# Patient Record
Sex: MALE | Race: WHITE | NOT HISPANIC OR LATINO | Employment: OTHER | URBAN - METROPOLITAN AREA
[De-identification: names, ages, dates, MRNs, and addresses within clinical notes are randomized per-mention and may not be internally consistent; named-entity substitution may affect disease eponyms.]

---

## 2022-09-16 ENCOUNTER — APPOINTMENT (OUTPATIENT)
Dept: CARDIOLOGY | Facility: HOSPITAL | Age: 72
End: 2022-09-16

## 2022-09-16 ENCOUNTER — APPOINTMENT (OUTPATIENT)
Dept: GENERAL RADIOLOGY | Facility: HOSPITAL | Age: 72
End: 2022-09-16

## 2022-09-16 ENCOUNTER — HOSPITAL ENCOUNTER (OUTPATIENT)
Facility: HOSPITAL | Age: 72
Setting detail: OBSERVATION
LOS: 1 days | Discharge: HOME | End: 2022-09-17
Attending: EMERGENCY MEDICINE | Admitting: INTERNAL MEDICINE

## 2022-09-16 DIAGNOSIS — I48.91 ATRIAL FIBRILLATION, UNSPECIFIED TYPE: Primary | ICD-10-CM

## 2022-09-16 DIAGNOSIS — R06.02 SHORTNESS OF BREATH: ICD-10-CM

## 2022-09-16 PROBLEM — I47.1 SVT (SUPRAVENTRICULAR TACHYCARDIA): Status: ACTIVE | Noted: 2022-09-16

## 2022-09-16 LAB
ALBUMIN SERPL-MCNC: 4 G/DL (ref 3.5–5.2)
ALBUMIN/GLOB SERPL: 1.6 G/DL
ALP SERPL-CCNC: 26 U/L (ref 39–117)
ALT SERPL W P-5'-P-CCNC: 14 U/L (ref 1–41)
ANION GAP SERPL CALCULATED.3IONS-SCNC: 14 MMOL/L (ref 5–15)
AST SERPL-CCNC: 20 U/L (ref 1–40)
BASOPHILS # BLD AUTO: 0 10*3/MM3 (ref 0–0.2)
BASOPHILS NFR BLD AUTO: 0.4 % (ref 0–1.5)
BH CV ECHO MEAS - AO MAX PG: 38.5 MMHG
BH CV ECHO MEAS - AO MEAN PG: 23 MMHG
BH CV ECHO MEAS - AO ROOT DIAM: 4 CM
BH CV ECHO MEAS - AO V2 MAX: 310.4 CM/SEC
BH CV ECHO MEAS - AO V2 VTI: 55.5 CM
BH CV ECHO MEAS - AVA(I,D): 0.7 CM2
BH CV ECHO MEAS - EDV(CUBED): 153.4 ML
BH CV ECHO MEAS - EDV(MOD-SP4): 83.5 ML
BH CV ECHO MEAS - EF(MOD-BP): 58 %
BH CV ECHO MEAS - EF(MOD-SP4): 58.1 %
BH CV ECHO MEAS - ESV(CUBED): 78.2 ML
BH CV ECHO MEAS - ESV(MOD-SP4): 35 ML
BH CV ECHO MEAS - FS: 20.1 %
BH CV ECHO MEAS - IVS/LVPW: 1.01 CM
BH CV ECHO MEAS - IVSD: 1.03 CM
BH CV ECHO MEAS - LA DIMENSION: 4.7 CM
BH CV ECHO MEAS - LV MASS(C)D: 210.8 GRAMS
BH CV ECHO MEAS - LV MAX PG: 2.27 MMHG
BH CV ECHO MEAS - LV MEAN PG: 1.01 MMHG
BH CV ECHO MEAS - LV V1 MAX: 75.3 CM/SEC
BH CV ECHO MEAS - LV V1 VTI: 13.9 CM
BH CV ECHO MEAS - LVIDD: 5.4 CM
BH CV ECHO MEAS - LVIDS: 4.3 CM
BH CV ECHO MEAS - LVOT AREA: 2.8 CM2
BH CV ECHO MEAS - LVOT DIAM: 1.89 CM
BH CV ECHO MEAS - LVPWD: 1.02 CM
BH CV ECHO MEAS - MR MAX PG: 138 MMHG
BH CV ECHO MEAS - MR MAX VEL: 586.9 CM/SEC
BH CV ECHO MEAS - MR MEAN PG: 74.9 MMHG
BH CV ECHO MEAS - MR MEAN VEL: 415 CM/SEC
BH CV ECHO MEAS - MR VTI: 113.7 CM
BH CV ECHO MEAS - MV MAX PG: 25.2 MMHG
BH CV ECHO MEAS - MV MEAN PG: 15 MMHG
BH CV ECHO MEAS - MV V2 VTI: 46.5 CM
BH CV ECHO MEAS - MVA(VTI): 0.84 CM2
BH CV ECHO MEAS - PA V2 MAX: 91.1 CM/SEC
BH CV ECHO MEAS - PI END-D VEL: 141.7 CM/SEC
BH CV ECHO MEAS - RAP SYSTOLE: 8 MMHG
BH CV ECHO MEAS - RV MAX PG: 0.96 MMHG
BH CV ECHO MEAS - RV V1 MAX: 48.9 CM/SEC
BH CV ECHO MEAS - RV V1 VTI: 7.5 CM
BH CV ECHO MEAS - RVDD: 2.6 CM
BH CV ECHO MEAS - RVSP: 43.6 MMHG
BH CV ECHO MEAS - SV(LVOT): 39.1 ML
BH CV ECHO MEAS - SV(MOD-SP4): 48.5 ML
BH CV ECHO MEAS - TR MAX PG: 35.6 MMHG
BH CV ECHO MEAS - TR MAX VEL: 298.2 CM/SEC
BILIRUB SERPL-MCNC: 1.1 MG/DL (ref 0–1.2)
BUN SERPL-MCNC: 18 MG/DL (ref 8–23)
BUN/CREAT SERPL: 15.4 (ref 7–25)
CALCIUM SPEC-SCNC: 8.8 MG/DL (ref 8.6–10.5)
CHLORIDE SERPL-SCNC: 99 MMOL/L (ref 98–107)
CO2 SERPL-SCNC: 23 MMOL/L (ref 22–29)
CREAT SERPL-MCNC: 1.17 MG/DL (ref 0.76–1.27)
DEPRECATED RDW RBC AUTO: 42.4 FL (ref 37–54)
EGFRCR SERPLBLD CKD-EPI 2021: 66.2 ML/MIN/1.73
EOSINOPHIL # BLD AUTO: 0 10*3/MM3 (ref 0–0.4)
EOSINOPHIL NFR BLD AUTO: 0.3 % (ref 0.3–6.2)
ERYTHROCYTE [DISTWIDTH] IN BLOOD BY AUTOMATED COUNT: 13.4 % (ref 12.3–15.4)
GLOBULIN UR ELPH-MCNC: 2.5 GM/DL
GLUCOSE SERPL-MCNC: 124 MG/DL (ref 65–99)
HCT VFR BLD AUTO: 42.2 % (ref 37.5–51)
HGB BLD-MCNC: 14.3 G/DL (ref 13–17.7)
LYMPHOCYTES # BLD AUTO: 1 10*3/MM3 (ref 0.7–3.1)
LYMPHOCYTES NFR BLD AUTO: 10 % (ref 19.6–45.3)
MAXIMAL PREDICTED HEART RATE: 148 BPM
MCH RBC QN AUTO: 30.6 PG (ref 26.6–33)
MCHC RBC AUTO-ENTMCNC: 33.7 G/DL (ref 31.5–35.7)
MCV RBC AUTO: 90.6 FL (ref 79–97)
MONOCYTES # BLD AUTO: 1.1 10*3/MM3 (ref 0.1–0.9)
MONOCYTES NFR BLD AUTO: 11.1 % (ref 5–12)
NEUTROPHILS NFR BLD AUTO: 78.2 % (ref 42.7–76)
NEUTROPHILS NFR BLD AUTO: 8 10*3/MM3 (ref 1.7–7)
NRBC BLD AUTO-RTO: 0.1 /100 WBC (ref 0–0.2)
NT-PROBNP SERPL-MCNC: 2234 PG/ML (ref 0–900)
PLATELET # BLD AUTO: 257 10*3/MM3 (ref 140–450)
PMV BLD AUTO: 8.4 FL (ref 6–12)
POTASSIUM SERPL-SCNC: 4.8 MMOL/L (ref 3.5–5.2)
PROT SERPL-MCNC: 6.5 G/DL (ref 6–8.5)
RBC # BLD AUTO: 4.66 10*6/MM3 (ref 4.14–5.8)
SODIUM SERPL-SCNC: 136 MMOL/L (ref 136–145)
STRESS TARGET HR: 126 BPM
TROPONIN T SERPL-MCNC: <0.01 NG/ML (ref 0–0.03)
WBC NRBC COR # BLD: 10.2 10*3/MM3 (ref 3.4–10.8)

## 2022-09-16 PROCEDURE — 96372 THER/PROPH/DIAG INJ SC/IM: CPT

## 2022-09-16 PROCEDURE — 93306 TTE W/DOPPLER COMPLETE: CPT | Performed by: INTERNAL MEDICINE

## 2022-09-16 PROCEDURE — 83880 ASSAY OF NATRIURETIC PEPTIDE: CPT | Performed by: NURSE PRACTITIONER

## 2022-09-16 PROCEDURE — 36415 COLL VENOUS BLD VENIPUNCTURE: CPT

## 2022-09-16 PROCEDURE — 84484 ASSAY OF TROPONIN QUANT: CPT | Performed by: NURSE PRACTITIONER

## 2022-09-16 PROCEDURE — 80053 COMPREHEN METABOLIC PANEL: CPT | Performed by: NURSE PRACTITIONER

## 2022-09-16 PROCEDURE — 85025 COMPLETE CBC W/AUTO DIFF WBC: CPT | Performed by: NURSE PRACTITIONER

## 2022-09-16 PROCEDURE — 96375 TX/PRO/DX INJ NEW DRUG ADDON: CPT

## 2022-09-16 PROCEDURE — 96376 TX/PRO/DX INJ SAME DRUG ADON: CPT

## 2022-09-16 PROCEDURE — 25010000002 ENOXAPARIN PER 10 MG: Performed by: INTERNAL MEDICINE

## 2022-09-16 PROCEDURE — 93306 TTE W/DOPPLER COMPLETE: CPT

## 2022-09-16 PROCEDURE — 96366 THER/PROPH/DIAG IV INF ADDON: CPT

## 2022-09-16 PROCEDURE — 96365 THER/PROPH/DIAG IV INF INIT: CPT

## 2022-09-16 PROCEDURE — 93010 ELECTROCARDIOGRAM REPORT: CPT | Performed by: INTERNAL MEDICINE

## 2022-09-16 PROCEDURE — 99284 EMERGENCY DEPT VISIT MOD MDM: CPT

## 2022-09-16 PROCEDURE — 25010000002 FUROSEMIDE PER 20 MG: Performed by: INTERNAL MEDICINE

## 2022-09-16 PROCEDURE — G0378 HOSPITAL OBSERVATION PER HR: HCPCS

## 2022-09-16 PROCEDURE — 71045 X-RAY EXAM CHEST 1 VIEW: CPT

## 2022-09-16 PROCEDURE — 93005 ELECTROCARDIOGRAM TRACING: CPT | Performed by: INTERNAL MEDICINE

## 2022-09-16 PROCEDURE — 99205 OFFICE O/P NEW HI 60 MIN: CPT | Performed by: INTERNAL MEDICINE

## 2022-09-16 PROCEDURE — 93005 ELECTROCARDIOGRAM TRACING: CPT

## 2022-09-16 RX ORDER — TRAMADOL HYDROCHLORIDE 50 MG/1
50 TABLET ORAL EVERY 6 HOURS PRN
Status: DISCONTINUED | OUTPATIENT
Start: 2022-09-16 | End: 2022-09-17 | Stop reason: HOSPADM

## 2022-09-16 RX ORDER — DILTIAZEM HYDROCHLORIDE 5 MG/ML
5 INJECTION INTRAVENOUS ONCE
Status: COMPLETED | OUTPATIENT
Start: 2022-09-16 | End: 2022-09-16

## 2022-09-16 RX ORDER — ACETAMINOPHEN 650 MG/1
650 SUPPOSITORY RECTAL EVERY 4 HOURS PRN
Status: DISCONTINUED | OUTPATIENT
Start: 2022-09-16 | End: 2022-09-17 | Stop reason: HOSPADM

## 2022-09-16 RX ORDER — METOPROLOL SUCCINATE 50 MG/1
50 TABLET, EXTENDED RELEASE ORAL
Status: DISCONTINUED | OUTPATIENT
Start: 2022-09-16 | End: 2022-09-17

## 2022-09-16 RX ORDER — ONDANSETRON 4 MG/1
4 TABLET, FILM COATED ORAL EVERY 6 HOURS PRN
Status: DISCONTINUED | OUTPATIENT
Start: 2022-09-16 | End: 2022-09-17 | Stop reason: HOSPADM

## 2022-09-16 RX ORDER — NITROGLYCERIN 0.4 MG/1
0.4 TABLET SUBLINGUAL
Status: DISCONTINUED | OUTPATIENT
Start: 2022-09-16 | End: 2022-09-17 | Stop reason: HOSPADM

## 2022-09-16 RX ORDER — ACETAMINOPHEN 160 MG/5ML
650 SOLUTION ORAL EVERY 4 HOURS PRN
Status: DISCONTINUED | OUTPATIENT
Start: 2022-09-16 | End: 2022-09-17 | Stop reason: HOSPADM

## 2022-09-16 RX ORDER — SODIUM CHLORIDE 0.9 % (FLUSH) 0.9 %
10 SYRINGE (ML) INJECTION AS NEEDED
Status: DISCONTINUED | OUTPATIENT
Start: 2022-09-16 | End: 2022-09-17 | Stop reason: HOSPADM

## 2022-09-16 RX ORDER — ACETAMINOPHEN 325 MG/1
650 TABLET ORAL EVERY 4 HOURS PRN
Status: DISCONTINUED | OUTPATIENT
Start: 2022-09-16 | End: 2022-09-17 | Stop reason: HOSPADM

## 2022-09-16 RX ORDER — AMIODARONE HYDROCHLORIDE 200 MG/1
400 TABLET ORAL EVERY 12 HOURS SCHEDULED
Status: DISCONTINUED | OUTPATIENT
Start: 2022-09-18 | End: 2022-09-17

## 2022-09-16 RX ORDER — AMIODARONE HYDROCHLORIDE 200 MG/1
200 TABLET ORAL DAILY
Status: DISCONTINUED | OUTPATIENT
Start: 2022-10-12 | End: 2022-09-17

## 2022-09-16 RX ORDER — FUROSEMIDE 10 MG/ML
40 INJECTION INTRAMUSCULAR; INTRAVENOUS ONCE
Status: COMPLETED | OUTPATIENT
Start: 2022-09-16 | End: 2022-09-16

## 2022-09-16 RX ORDER — DILTIAZEM HCL/D5W 125 MG/125
5-15 PLASTIC BAG, INJECTION (ML) INTRAVENOUS
Status: DISCONTINUED | OUTPATIENT
Start: 2022-09-16 | End: 2022-09-17

## 2022-09-16 RX ORDER — ONDANSETRON 2 MG/ML
4 INJECTION INTRAMUSCULAR; INTRAVENOUS EVERY 6 HOURS PRN
Status: DISCONTINUED | OUTPATIENT
Start: 2022-09-16 | End: 2022-09-17 | Stop reason: HOSPADM

## 2022-09-16 RX ORDER — SODIUM CHLORIDE 0.9 % (FLUSH) 0.9 %
10 SYRINGE (ML) INJECTION EVERY 12 HOURS SCHEDULED
Status: DISCONTINUED | OUTPATIENT
Start: 2022-09-16 | End: 2022-09-17 | Stop reason: HOSPADM

## 2022-09-16 RX ORDER — AMIODARONE HYDROCHLORIDE 200 MG/1
200 TABLET ORAL EVERY 12 HOURS
Status: DISCONTINUED | OUTPATIENT
Start: 2022-09-28 | End: 2022-09-17

## 2022-09-16 RX ORDER — LISINOPRIL 5 MG/1
10 TABLET ORAL
Status: DISCONTINUED | OUTPATIENT
Start: 2022-09-17 | End: 2022-09-17 | Stop reason: HOSPADM

## 2022-09-16 RX ORDER — ENOXAPARIN SODIUM 100 MG/ML
1 INJECTION SUBCUTANEOUS EVERY 12 HOURS
Status: DISCONTINUED | OUTPATIENT
Start: 2022-09-16 | End: 2022-09-17

## 2022-09-16 RX ORDER — AMIODARONE HYDROCHLORIDE 200 MG/1
200 TABLET ORAL ONCE
Status: DISCONTINUED | OUTPATIENT
Start: 2022-09-18 | End: 2022-09-17

## 2022-09-16 RX ADMIN — Medication 5 MG/HR: at 16:43

## 2022-09-16 RX ADMIN — METOPROLOL SUCCINATE 50 MG: 50 TABLET, EXTENDED RELEASE ORAL at 21:13

## 2022-09-16 RX ADMIN — FUROSEMIDE 40 MG: 10 INJECTION, SOLUTION INTRAMUSCULAR; INTRAVENOUS at 21:14

## 2022-09-16 RX ADMIN — ACETAMINOPHEN 650 MG: 325 TABLET, FILM COATED ORAL at 21:30

## 2022-09-16 RX ADMIN — DILTIAZEM HYDROCHLORIDE 5 MG: 5 INJECTION, SOLUTION INTRAVENOUS at 16:42

## 2022-09-16 RX ADMIN — ENOXAPARIN SODIUM 90 MG: 100 INJECTION SUBCUTANEOUS at 18:55

## 2022-09-16 RX ADMIN — Medication 10 ML: at 21:00

## 2022-09-16 NOTE — ED PROVIDER NOTES
Subjective   History of Present Illness  Patient presents with:  Shortness of Breath    No primary care provider on file.   No LMP for male patient.    Patient is a 72-year-old male presents emergency department with complaint of dyspnea, fatigue over the past week, patient does report he is from out of town, he states he is visiting his daughter, and reports that he has 2 valve replacements, he was told by his cardiologist that they may not be working properly, if they have fatigue or dyspnea to report to the emergency department.  He denies any dizziness, lightheadedness, diaphoresis or syncope.  No abnormal leg pain or swelling.  No chest pain.  Patient is not currently anticoagulated.  He denies any history of atrial fibrillation that he is aware of.        Review of Systems   Constitutional: Negative for chills and fever.   Respiratory: Positive for shortness of breath. Negative for chest tightness.    Cardiovascular: Negative for chest pain, palpitations and leg swelling.   Gastrointestinal: Negative for abdominal pain, diarrhea, nausea and vomiting.   Genitourinary: Negative for dysuria.   Musculoskeletal: Negative for back pain and neck pain.   Skin: Negative for color change and rash.   Neurological: Negative for dizziness, syncope, weakness and light-headedness.       No past medical history on file.    Allergies   Allergen Reactions   • Augmentin [Amoxicillin-Pot Clavulanate] Other (See Comments)     Abnormal liver function labs   • Clindamycin/Lincomycin Other (See Comments)     Pt reports clinda mycin causes abnormal liver function        No past surgical history on file.    No family history on file.    Social History     Socioeconomic History   • Marital status: Legally            Objective   Physical Exam  Vitals and nursing note reviewed.   Constitutional:       General: He is not in acute distress.     Appearance: He is well-developed. He is not toxic-appearing.   HENT:      Head:  "Normocephalic and atraumatic.      Mouth/Throat:      Mouth: Mucous membranes are moist.      Pharynx: Oropharynx is clear.   Eyes:      Extraocular Movements: Extraocular movements intact.      Pupils: Pupils are equal, round, and reactive to light.   Cardiovascular:      Rate and Rhythm: Tachycardia present. Rhythm irregular.      Heart sounds: No murmur heard.    No friction rub. No gallop.   Pulmonary:      Effort: Pulmonary effort is normal.      Breath sounds: Normal breath sounds.   Musculoskeletal:         General: Normal range of motion.      Cervical back: Normal range of motion and neck supple.      Right lower leg: No tenderness. No edema.      Left lower leg: No tenderness. No edema.   Skin:     General: Skin is warm and dry.      Capillary Refill: Capillary refill takes less than 2 seconds.   Neurological:      General: No focal deficit present.      Mental Status: He is alert and oriented to person, place, and time.   Psychiatric:         Mood and Affect: Mood normal.         Behavior: Behavior normal.         Procedures           ED Course  ED Course as of 09/16/22 2014   Fri Sep 16, 2022   1703 Nursing staff to remove O2 saturations were 83, 85 and 0%.  She reported these were not accurate. [LB]   1716 Patient resting.  No acute distress. [LB]   1723 Spoke with Dr. Mcmullen [LB]   1724 Unit secondary to due to time records, however they were closed for the day. [LB]      ED Course User Index  [LB] Rhoda Pantoja, APRN                /71   Pulse (!) 144   Temp 98.4 °F (36.9 °C) (Oral)   Resp 18   Ht 172.7 cm (68\")   Wt 88.6 kg (195 lb 5.2 oz)   SpO2 92%   BMI 29.70 kg/m²   Labs Reviewed   COMPREHENSIVE METABOLIC PANEL - Abnormal; Notable for the following components:       Result Value    Glucose 124 (*)     Alkaline Phosphatase 26 (*)     All other components within normal limits    Narrative:     GFR Normal >60  Chronic Kidney Disease <60  Kidney Failure <15     BNP (IN-HOUSE) - " Abnormal; Notable for the following components:    proBNP 2,234.0 (*)     All other components within normal limits    Narrative:     Among patients with dyspnea, NT-proBNP is highly sensitive for the detection of acute congestive heart failure. In addition NT-proBNP of <300 pg/ml effectively rules out acute congestive heart failure with 99% negative predictive value.    Results may be falsely decreased if patient taking Biotin.     CBC WITH AUTO DIFFERENTIAL - Abnormal; Notable for the following components:    Neutrophil % 78.2 (*)     Lymphocyte % 10.0 (*)     Neutrophils, Absolute 8.00 (*)     Monocytes, Absolute 1.10 (*)     All other components within normal limits   TROPONIN (IN-HOUSE) - Normal    Narrative:     Troponin T Reference Range:  <= 0.03 ng/mL-   Negative for AMI  >0.03 ng/mL-     Abnormal for myocardial necrosis.  Clinicians would have to utilize clinical acumen, EKG, Troponin and serial changes to determine if it is an Acute Myocardial Infarction or myocardial injury due to an underlying chronic condition.       Results may be falsely decreased if patient taking Biotin.     CBC AND DIFFERENTIAL    Narrative:     The following orders were created for panel order CBC & Differential.  Procedure                               Abnormality         Status                     ---------                               -----------         ------                     CBC Auto Differential[[id number]]        Abnormal            Final result                 Please view results for these tests on the individual orders.   GOLD TOP - SST   EXTRA TUBES    Narrative:     The following orders were created for panel order Extra Tubes.  Procedure                               Abnormality         Status                     ---------                               -----------         ------                     Gold Top - SST[[id number]]                                   Final result               Light Blue Top[[id number]]                                                                 Please view results for these tests on the individual orders.   LIGHT BLUE TOP     Medications   sodium chloride 0.9 % flush 10 mL (has no administration in time range)   dilTIAZem (CARDIZEM) 125 mg in 125 mL D5W infusion (15 mg/hr Intravenous Rate/Dose Change 9/16/22 1857)   sodium chloride 0.9 % flush 10 mL (has no administration in time range)   sodium chloride 0.9 % flush 10 mL (has no administration in time range)   ondansetron (ZOFRAN) tablet 4 mg (has no administration in time range)     Or   ondansetron (ZOFRAN) injection 4 mg (has no administration in time range)   Enoxaparin Sodium (LOVENOX) syringe 90 mg (90 mg Subcutaneous Given 9/16/22 1855)   nitroglycerin (NITROSTAT) SL tablet 0.4 mg (has no administration in time range)   acetaminophen (TYLENOL) tablet 650 mg (has no administration in time range)     Or   acetaminophen (TYLENOL) 160 MG/5ML solution 650 mg (has no administration in time range)     Or   acetaminophen (TYLENOL) suppository 650 mg (has no administration in time range)   lisinopril (PRINIVIL,ZESTRIL) tablet 10 mg (has no administration in time range)   traMADol (ULTRAM) tablet 50 mg (has no administration in time range)   amiodarone in dextrose 5% (NEXTERONE) loading dose 150mg/100mL (has no administration in time range)     Followed by   amiodarone 360 mg in 200 mL D5W infusion (has no administration in time range)     Followed by   amiodarone 360 mg in 200 mL D5W infusion (has no administration in time range)     Followed by   amiodarone (PACERONE) tablet 200 mg (has no administration in time range)     Followed by   amiodarone (PACERONE) tablet 400 mg (has no administration in time range)     Followed by   amiodarone (PACERONE) tablet 200 mg (has no administration in time range)     Followed by   amiodarone (PACERONE) tablet 200 mg (has no administration in time range)   metoprolol succinate XL (TOPROL-XL) 24 hr tablet 50 mg  (has no administration in time range)   furosemide (LASIX) injection 40 mg (has no administration in time range)   dilTIAZem (CARDIZEM) injection 5 mg (5 mg Intravenous Given 9/16/22 1642)     XR Chest 1 View    Result Date: 9/16/2022  No acute chest finding. Mild cardiomegaly with valve replacement.  Electronically Signed By-Nanda Aparicio MD On:9/16/2022 4:13 PM This report was finalized on [id number] by  Nanda Aparicio MD.                               MDM  Record/Chart Review: No records previously available.  EKG reviewed by MD and I, our findings are: A. fib rate of 150  No previous available.  Differentials: [place] imbalance, arrhythmia, CHF, STEMI, NSTEMI  Patient was brought back to the emergency department room for evaluation and placed on appropriate monitoring.  Patient had IV established and blood work obtained.  Patient noted to be in A. fib with RVR.  He was started on a Cardizem drip.  CBC, CMP, troponin, BNP reviewed.  Chest x-ray reviewed.  Patient will be admitted to hospitalist service for further evaluation.    Managment of patient discussed with: Hospitalist.  Call was placed to cardiology, however no callback at this time.  Patient family requested Dr. Guzman.  A note was initiated by him.    Note Disclaimer: At [facility], we believe that sharing information builds trust and better relationships. You are receiving this note because you recently visited [facility]. It is possible you will see health information before a provider has talked with you about it. This kind of information can be easy to misunderstand. To help you fully understand what it means for your health, we urge you to discuss this note with your provider.  Note dicatated utilizing Dragon Dictation.     Final diagnoses:   Atrial fibrillation, unspecified type (HCC)   Shortness of breath       ED Disposition  ED Disposition     ED Disposition   Decision to Admit    Condition   --    Comment   Level of Care:  Telemetry [5]   Diagnosis: Atrial fibrillation, unspecified type (HCC) [[id number]]               No follow-up provider specified.       Medication List      No changes were made to your prescriptions during this visit.          Rhoda Pantoja, APRN  09/16/22 2014

## 2022-09-16 NOTE — CONSULTS
Referring Provider: Edith Mcmullen DO  Reason for Consultation: Atrial fibrillation with rapid ventricular response    Patient Care Team:  Jono Guzman MD as PCP - General (Cardiology)    Chief complaint : Dyspnea on exertion, shortness of breath      History of present illness:  Rodrigo Miguel is a 72 y.o. male  with extensive cardiovascular history including aortic, tricuspid and ?  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  He is originally from [place] and visiting [place] for his high school reunion.  He has noted that over the last 2 weeks his shortness of breath has gotten worse to the point that now he gets out of breath with minimal exertion, walking a few feet causes him to have significant shortness of breath.  His daughter Madie Forrest contacted me about his symptoms and was worried about his valvular heart disease as he was recently told that his aortic prosthesis was not working well.  He denies any chest pain.  He does have significant back pain and lower extremity pain which she feels is at his baseline however the pain has worsened since his arrival to the emergency room.  While in the emergency room an ECG showed atrial fibrillation with rapid ventricular response with heart rates in the 150s.  He has been started on a Cardizem drip.  His home medications include lisinopril, carvedilol, aspirin.    REVIEW OF SYSTEMS:    Constitutional: No weakness,fatigue, fever, rigors, chills   Eyes: No vision changes, eye pain   ENT/oropharynx: No difficulty swallowing, sore throat, epistaxis, changes in hearing   Cardiovascular: No chest pain, chest tightness, palpitations, paroxysmal nocturnal dyspnea, orthopnea, diaphoresis, dizziness / syncopal episode   Respiratory: + shortness of breath, dyspnea on exertion, cough, wheezing hemoptysis   Gastrointestinal: No abdominal pain, nausea, vomiting, diarrhea, bloody stools   Genitourinary: No hematuria, dysuria   Neurological: No headache,  "tremors, numbness,  one-sided weakness    Musculoskeletal: No cramps, myalgias,  joint pain, joint swelling   Integument: No rash, edema          History  Past medical history of endocarditis    Past surgical history including aortic,?  Mitral and tricuspid valve replacement    No significant family history of valvular heart disease, premature coronary disease, sudden cardiac death      Augmentin [amoxicillin-pot clavulanate] and Clindamycin/lincomycin    Scheduled Meds:enoxaparin, 1 mg/kg, Subcutaneous, Q12H  [START ON 9/17/2022] lisinopril, 10 mg, Oral, Q24H  sodium chloride, 10 mL, Intravenous, Q12H      Continuous Infusions:dilTIAZem, 5-15 mg/hr, Last Rate: 15 mg/hr (09/16/22 1857)      PRN Meds:.•  acetaminophen **OR** acetaminophen **OR** acetaminophen  •  nitroglycerin  •  ondansetron **OR** ondansetron  •  [COMPLETED] Insert peripheral IV **AND** sodium chloride  •  sodium chloride  •  traMADol    Objective     VITAL SIGNS  Vitals:    09/16/22 1902 09/16/22 1912 09/16/22 1922 09/16/22 1931   BP: 103/62 111/76 105/70 104/71   BP Location:       Patient Position:       Pulse: (!) 125 118 118 (!) 144   Resp:       Temp:       TempSrc:       SpO2: 92% 96% 96% 92%   Weight:       Height:           Flowsheet Rows    Flowsheet Row First Filed Value   Admission Height 172.7 cm (68\") Documented at 09/16/2022 1537   Admission Weight 88.6 kg (195 lb 5.2 oz) Documented at 09/16/2022 1537          No intake or output data in the 24 hours ending 09/16/22 1949     TELEMETRY: Atrial fibrillation with rapid ventricular response    Physical Exam:  The patient is alert, oriented and in no distress.  Vital signs as noted above.  Head and neck revealed no carotid bruits or jugular venous distention.  No thyromegaly or lymph adenopathy is present  Lungs clear.  No wheezing.  Breath sounds are normal bilaterally.  Irregularly irregular and tachycardic.  Difficult to appreciate murmur however there is a systolic ejection murmur at " the right upper sternal border.  There is also a holosystolic murmur at the apex. no precordial rub is present.  No gallop is present.  Abdomen soft and nontender.  No organomegaly is present.  Extremities with good peripheral pulses without any pedal edema.  Skin warm and dry.  Musculoskeletal system is grossly normal  CNS grossly normal      Results Review:   I reviewed the patient's new clinical results.  Lab Results (last 24 hours)     Procedure Component Value Units Date/Time    Extra Tubes [[id number]] Collected: 09/16/22 1656    Specimen: Blood, Venous Line Updated: 09/16/22 1715    Narrative:      The following orders were created for panel order Extra Tubes.  Procedure                               Abnormality         Status                     ---------                               -----------         ------                     Gold Top - SST[[id number]]                                   Final result               Light Blue Top[[id number]]                                                                Please view results for these tests on the individual orders.    Gold Top - SST [[id number]] Collected: 09/16/22 1656    Specimen: Blood Updated: 09/16/22 1715    Comprehensive Metabolic Panel [[id number]]  (Abnormal) Collected: 09/16/22 1656    Specimen: Blood Updated: 09/16/22 1712     Glucose 124 mg/dL      BUN 18 mg/dL      Creatinine 1.17 mg/dL      Sodium 136 mmol/L      Potassium 4.8 mmol/L      Comment: Slight hemolysis detected by analyzer. Results may be affected.        Chloride 99 mmol/L      CO2 23.0 mmol/L      Calcium 8.8 mg/dL      Total Protein 6.5 g/dL      Albumin 4.00 g/dL      ALT (SGPT) 14 U/L      AST (SGOT) 20 U/L      Alkaline Phosphatase 26 U/L      Total Bilirubin 1.1 mg/dL      Globulin 2.5 gm/dL      A/G Ratio 1.6 g/dL      BUN/Creatinine Ratio 15.4     Anion Gap 14.0 mmol/L      eGFR 66.2 mL/min/1.73      Comment: National Kidney Foundation and American Society of Nephrology (ASN)  Task Force recommended calculation based on the Chronic Kidney Disease Epidemiology Collaboration (CKD-EPI) equation refit without adjustment for race.       Narrative:      GFR Normal >60  Chronic Kidney Disease <60  Kidney Failure <15      Troponin [[id number]]  (Normal) Collected: 09/16/22 1656    Specimen: Blood Updated: 09/16/22 1712     Troponin T <0.010 ng/mL     Narrative:      Troponin T Reference Range:  <= 0.03 ng/mL-   Negative for AMI  >0.03 ng/mL-     Abnormal for myocardial necrosis.  Clinicians would have to utilize clinical acumen, EKG, Troponin and serial changes to determine if it is an Acute Myocardial Infarction or myocardial injury due to an underlying chronic condition.       Results may be falsely decreased if patient taking Biotin.      BNP [[id number]]  (Abnormal) Collected: 09/16/22 1656    Specimen: Blood Updated: 09/16/22 1711     proBNP 2,234.0 pg/mL     Narrative:      Among patients with dyspnea, NT-proBNP is highly sensitive for the detection of acute congestive heart failure. In addition NT-proBNP of <300 pg/ml effectively rules out acute congestive heart failure with 99% negative predictive value.    Results may be falsely decreased if patient taking Biotin.      CBC & Differential [[id number]]  (Abnormal) Collected: 09/16/22 1620    Specimen: Blood from Arm, Right Updated: 09/16/22 1629    Narrative:      The following orders were created for panel order CBC & Differential.  Procedure                               Abnormality         Status                     ---------                               -----------         ------                     CBC Auto Differential[[id number]]        Abnormal            Final result                 Please view results for these tests on the individual orders.    CBC Auto Differential [[id number]]  (Abnormal) Collected: 09/16/22 1620    Specimen: Blood from Arm, Right Updated: 09/16/22 1629     WBC 10.20 10*3/mm3      RBC 4.66 10*6/mm3      Hemoglobin  14.3 g/dL      Hematocrit 42.2 %      MCV 90.6 fL      MCH 30.6 pg      MCHC 33.7 g/dL      RDW 13.4 %      RDW-SD 42.4 fl      MPV 8.4 fL      Platelets 257 10*3/mm3      Neutrophil % 78.2 %      Lymphocyte % 10.0 %      Monocyte % 11.1 %      Eosinophil % 0.3 %      Basophil % 0.4 %      Neutrophils, Absolute 8.00 10*3/mm3      Lymphocytes, Absolute 1.00 10*3/mm3      Monocytes, Absolute 1.10 10*3/mm3      Eosinophils, Absolute 0.00 10*3/mm3      Basophils, Absolute 0.00 10*3/mm3      nRBC 0.1 /100 WBC           Imaging Results (Last 24 Hours)     Procedure Component Value Units Date/Time    XR Chest 1 View [[id number]] Collected: 09/16/22 1612     Updated: 09/16/22 1615    Narrative:      DATE OF EXAM:  9/16/2022 4:09 PM     PROCEDURE:  XR CHEST 1 VW-     INDICATIONS:  dyspnea       COMPARISON:  None     TECHNIQUE:   Single radiographic view of the chest was obtained.     FINDINGS:  Mild interstitial prominence in the bases is favored to represent a  chronic finding. No acute airspace disease. Heart size is mildly  enlarged with median sternotomy and valve replacement. Pulmonary  vascular distribution is within normal limits. No pleural effusion or  pneumothorax or acute osseous abnormality.       Impression:      No acute chest finding.  Mild cardiomegaly with valve replacement.     Electronically Signed By-Nanda Aparicio MD On:9/16/2022 4:13 PM  This report was finalized on [id number] by  Nanda Aparicio MD.      LAB RESULTS (LAST 7 DAYS)    CBC  Results from last 7 days   Lab Units 09/16/22  1620   WBC 10*3/mm3 10.20   RBC 10*6/mm3 4.66   HEMOGLOBIN g/dL 14.3   HEMATOCRIT % 42.2   MCV fL 90.6   PLATELETS 10*3/mm3 257       BMP  Results from last 7 days   Lab Units 09/16/22  1656   SODIUM mmol/L 136   POTASSIUM mmol/L 4.8   CHLORIDE mmol/L 99   CO2 mmol/L 23.0   BUN mg/dL 18   CREATININE mg/dL 1.17   GLUCOSE mg/dL 124*       CMP   Results from last 7 days   Lab Units 09/16/22  1656   SODIUM mmol/L 136    POTASSIUM mmol/L 4.8   CHLORIDE mmol/L 99   CO2 mmol/L 23.0   BUN mg/dL 18   CREATININE mg/dL 1.17   GLUCOSE mg/dL 124*   ALBUMIN g/dL 4.00   BILIRUBIN mg/dL 1.1   ALK PHOS U/L 26*   AST (SGOT) U/L 20   ALT (SGPT) U/L 14         BNP        TROPONIN  Results from last 7 days   Lab Units 09/16/22  1656   TROPONIN T ng/mL <0.010       CoAg        Creatinine Clearance  Estimated Creatinine Clearance: 61.8 mL/min (by C-G formula based on SCr of 1.17 mg/dL).    ABG        Radiology  XR Chest 1 View    Result Date: 9/16/2022  No acute chest finding. Mild cardiomegaly with valve replacement.  Electronically Signed By-Nanda Aparicio MD On:9/16/2022 4:13 PM This report was finalized on [id number] by  Nanda Aparicio MD.        EKG      I personally viewed and interpreted the patient's EKG/Telemetry data:    ECHOCARDIOGRAM:    Results for orders placed during the hospital encounter of 09/16/22    Adult Transthoracic Echo Complete W/ Cont if Necessary Per Protocol    Interpretation Summary  · The left ventricular cavity is borderline dilated.  · Calculated left ventricular EF = 58% Estimated left ventricular EF was in agreement with the calculated left ventricular EF. Left ventricular systolic function is normal.  · Left ventricular diastolic dysfunction is noted.  · Left atrial volume is moderately increased.  · The right atrial cavity is moderately dilated.  · There is a prosthetic aortic valve present.  · Moderate to severe aortic valve stenosis is present.  · There is a prosthetic mitral valve present.  · Moderate to severe mitral valve regurgitation is present with an eccentric jet noted.  · Moderate to severe mitral valve stenosis is present.  · Estimated right ventricular systolic pressure from tricuspid regurgitation is mildly elevated (35-45 mmHg).  · Mild to moderate pulmonary hypertension is present.  · Consider ODESSA assessment of the valves when the HR is controlled              Cardiolite (Tc-99m Sestamibi)  stress test      OTHER:     Assessment & Plan     Principal Problem:    SVT (supraventricular tachycardia) ([facility])  Active Problems:    Atrial fibrillation, unspecified type (HCC)  Bioprosthetic aortic valve stenosis  Bioprosthetic mitral regurgitation  Hypertension  Pulmonary hypertension  Back pain    New onset atrial fibrillation with rapid ventricular response in the setting of a severely dilated left atrium and HFpEF  Recommend starting amiodarone: 150 mg IV bolus followed by 400 mg p.o. twice daily  Start Toprol-XL 50 mg p.o. daily  Wean off Cardizem drip  Will give 1 dose of IV Lasix today, IVC was dilated in the echocardiogram and proBNP is elevated more than 2000  Start anticoagulation with heparin drip or lovenox with plan to start oral anticoagulation upon discharge.  Xarelto or Eliquis will be started in the morning.  Echocardiogram shows moderate aortic stenosis, moderate to severe mitral valve regurgitation.  He should have a transesophageal echocardiogram once heart rate is better controlled.  ODESSA can be performed in [place]  Resume lisinopril for blood pressure control.  Hold off on Coreg, prefer Toprol-XL for better heart rate control  Needs better back pain control which could also be driving his tachycardia.  Once his heart rate is better controlled he will be discharged with plans to follow-up with his usual cardiologist in [place].    Jono Guzman MD  09/16/22  19:49 EDT

## 2022-09-16 NOTE — H&P
[facility] Medicine Services      Patient Name: Rodrigo Miguel  : 1950  MRN: [id number]  Primary Care Physician:  Jono Guzman MD  Date of admission: 2022      Subjective      Chief Complaint: Dyspnea    History of Present Illness: Rodrigo Miguel is a 72 y.o. 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  Patient is from [place] and visiting here and saw his cardiologist there.  He was told that his aortic valve may not be functioning well and to look out for symptoms of dyspnea, chest pain, dizziness, etc.  Patient is status he came here over this week and has been having worsening dyspnea and fatigue.  He was feeling all right but on the day of admission his symptoms got worse which made him come to the ER.  In the ER, patient noted to have A. fib RVR.  Cardiology consulted on admission.      ROS: Pertinent positives as noted in HPI/subjective.  All other systems were reviewed and are negative.      Personal History     PMH: As noted in HPI    PSH: Open heart surgery for valve replacement    Family History: Reviewed and denies any    Social History:  No tobacco, drug, alcohol use    Home Medications:  Prior to Admission Medications     None      Coreg, lisinopril, aspirin      Allergies:  Allergies   Allergen Reactions   • Augmentin [Amoxicillin-Pot Clavulanate] Other (See Comments)     Abnormal liver function labs   • Clindamycin/Lincomycin Other (See Comments)     Pt reports clinda mycin causes abnormal liver function        Objective      Vitals:   Temp:  [98.4 °F (36.9 °C)] 98.4 °F (36.9 °C)  Heart Rate:  [109-177] 138  Resp:  [18] 18  BP: ()/(44-78) 105/57    Physical Exam  Constitutional:       General: He is not in acute distress.     Appearance: Normal appearance. He is not toxic-appearing.   HENT:       Head: Normocephalic and atraumatic.      Nose: Nose normal.      Mouth/Throat:      Mouth: Mucous membranes are moist.   Eyes:      Extraocular Movements: Extraocular movements intact.      Conjunctiva/sclera: Conjunctivae normal.      Pupils: Pupils are equal, round, and reactive to light.   Cardiovascular:      Rate and Rhythm: Tachycardia present. Rhythm irregular.      Pulses: Normal pulses.      Heart sounds: Normal heart sounds. No murmur heard.  Pulmonary:      Effort: Pulmonary effort is normal.      Breath sounds: Normal breath sounds.   Abdominal:      General: Abdomen is flat. Bowel sounds are normal. There is no distension.      Palpations: Abdomen is soft.      Tenderness: There is no abdominal tenderness. There is no guarding.   Musculoskeletal:         General: No swelling or deformity. Normal range of motion.      Cervical back: Normal range of motion and neck supple.   Skin:     General: Skin is warm and dry.   Neurological:      General: No focal deficit present.      Mental Status: He is alert and oriented to person, place, and time. Mental status is at baseline.      Cranial Nerves: No cranial nerve deficit.      Motor: No weakness.   Psychiatric:         Mood and Affect: Mood normal.         Behavior: Behavior normal.         Thought Content: Thought content normal.         Judgment: Judgment normal.         Result Review    Result Review:  I have personally reviewed the results from the time of this admission to 9/16/2022 17:39 EDT and agree with these findings:  [x]  Laboratory  []  Microbiology  [x]  Radiology  [x]  EKG/Telemetry   [x]  Cardiology/Vascular   []  Pathology  [x]  Old records  []  Other:      Assessment & Plan        Active Hospital Problems:  Active Hospital Problems    Diagnosis    • **SVT (supraventricular tachycardia) (HCC)    • Atrial fibrillation, unspecified type (HCC)      Plan:   A. samantha RVR  -Started on Cardizem drip in the ER, wean down as tolerated  -Discussed  with cardiology, plan to start beta-blocker and wean off Cardizem  -C2V greater than 2, discussed with cardiology to start antirotation, Lovenox twice daily added for now  -Echo ordered by cardiology in the ER  -Monitor on telemetry    Valvular heart disease  -s/p aortic and tricuspid valve replacement in 2016  -Not on any anticoagulation other than baby aspirin at home  -Echo ordered to evaluate function as noted above  -Cardiology following    Hypertension  -Resume lisinopril as able  -Hold Coreg for now, cardiology to start preferred beta-blocker for rate control  -Monitor    Chronic back pain  -Continue symptomatic management  -Patient has scheduled outpatient follow-up with pain management when he goes back    DVT prophylaxis  -Lovenox    CODE STATUS:    Code Status (Patient has no pulse and is not breathing): CPR (Attempt to Resuscitate)  Medical Interventions (Patient has pulse or is breathing): Full Support    Admission Status:  I believe this patient meets obs status.    I discussed the patient's findings and my recommendations with patient, family and consulting provider.    Signature:   Electronically signed by Edith Mcmullen DO, 09/16/22, 5:39 PM EDT.    Much of this encounter note is an electronic transcription/translation of spoken language to printed text. The electronic translation of spoken language may permit erroneous, or at times, nonsensical words or phrases to be inadvertently transcribed; Although I have reviewed the note for such errors, some may still exist.

## 2022-09-17 ENCOUNTER — READMISSION MANAGEMENT (OUTPATIENT)
Dept: CALL CENTER | Facility: HOSPITAL | Age: 72
End: 2022-09-17

## 2022-09-17 VITALS
HEIGHT: 68 IN | BODY MASS INDEX: 29.5 KG/M2 | DIASTOLIC BLOOD PRESSURE: 66 MMHG | HEART RATE: 115 BPM | WEIGHT: 194.67 LBS | SYSTOLIC BLOOD PRESSURE: 107 MMHG | OXYGEN SATURATION: 98 % | TEMPERATURE: 97.9 F | RESPIRATION RATE: 20 BRPM

## 2022-09-17 PROBLEM — Z98.890 S/P TVR (TRICUSPID VALVE REPAIR): Status: ACTIVE | Noted: 2022-09-17

## 2022-09-17 PROBLEM — I10 PRIMARY HYPERTENSION: Status: ACTIVE | Noted: 2022-09-17

## 2022-09-17 PROBLEM — Z95.2 S/P MVR (MITRAL VALVE REPLACEMENT): Status: ACTIVE | Noted: 2022-09-17

## 2022-09-17 PROBLEM — Z95.2 S/P AVR (AORTIC VALVE REPLACEMENT): Status: ACTIVE | Noted: 2022-09-17

## 2022-09-17 PROBLEM — I50.33 ACUTE ON CHRONIC HEART FAILURE WITH PRESERVED EJECTION FRACTION: Status: ACTIVE | Noted: 2022-09-17

## 2022-09-17 LAB — QT INTERVAL: 287 MS

## 2022-09-17 PROCEDURE — 96372 THER/PROPH/DIAG INJ SC/IM: CPT

## 2022-09-17 PROCEDURE — G0378 HOSPITAL OBSERVATION PER HR: HCPCS

## 2022-09-17 PROCEDURE — 93010 ELECTROCARDIOGRAM REPORT: CPT | Performed by: INTERNAL MEDICINE

## 2022-09-17 PROCEDURE — 25010000002 FUROSEMIDE PER 20 MG: Performed by: INTERNAL MEDICINE

## 2022-09-17 PROCEDURE — 96366 THER/PROPH/DIAG IV INF ADDON: CPT

## 2022-09-17 PROCEDURE — 25010000002 ENOXAPARIN PER 10 MG: Performed by: INTERNAL MEDICINE

## 2022-09-17 PROCEDURE — 25010000002 AMIODARONE IN DEXTROSE 5% 150-4.21 MG/100ML-% SOLUTION: Performed by: INTERNAL MEDICINE

## 2022-09-17 PROCEDURE — 99215 OFFICE O/P EST HI 40 MIN: CPT | Performed by: INTERNAL MEDICINE

## 2022-09-17 PROCEDURE — 96367 TX/PROPH/DG ADDL SEQ IV INF: CPT

## 2022-09-17 PROCEDURE — 96376 TX/PRO/DX INJ SAME DRUG ADON: CPT

## 2022-09-17 PROCEDURE — 25010000002 AMIODARONE IN DEXTROSE 5% 360-4.14 MG/200ML-% SOLUTION: Performed by: INTERNAL MEDICINE

## 2022-09-17 PROCEDURE — 93005 ELECTROCARDIOGRAM TRACING: CPT | Performed by: INTERNAL MEDICINE

## 2022-09-17 RX ORDER — AMIODARONE HYDROCHLORIDE 400 MG/1
400 TABLET ORAL EVERY 12 HOURS SCHEDULED
Qty: 13 TABLET | Refills: 0 | Status: SHIPPED | OUTPATIENT
Start: 2022-09-17 | End: 2022-09-24

## 2022-09-17 RX ORDER — MULTIPLE VITAMINS W/ MINERALS TAB 9MG-400MCG
1 TAB ORAL DAILY
COMMUNITY

## 2022-09-17 RX ORDER — METOPROLOL SUCCINATE 50 MG/1
50 TABLET, EXTENDED RELEASE ORAL EVERY 12 HOURS SCHEDULED
Status: DISCONTINUED | OUTPATIENT
Start: 2022-09-17 | End: 2022-09-17 | Stop reason: HOSPADM

## 2022-09-17 RX ORDER — AMIODARONE HYDROCHLORIDE 200 MG/1
400 TABLET ORAL EVERY 12 HOURS SCHEDULED
Status: DISCONTINUED | OUTPATIENT
Start: 2022-09-17 | End: 2022-09-17 | Stop reason: HOSPADM

## 2022-09-17 RX ORDER — PANTOPRAZOLE SODIUM 20 MG/1
20 TABLET, DELAYED RELEASE ORAL DAILY
COMMUNITY

## 2022-09-17 RX ORDER — CARVEDILOL 12.5 MG/1
12.5 TABLET ORAL 2 TIMES DAILY WITH MEALS
COMMUNITY
End: 2022-09-17 | Stop reason: HOSPADM

## 2022-09-17 RX ORDER — FUROSEMIDE 10 MG/ML
40 INJECTION INTRAMUSCULAR; INTRAVENOUS ONCE
Status: COMPLETED | OUTPATIENT
Start: 2022-09-17 | End: 2022-09-17

## 2022-09-17 RX ORDER — LISINOPRIL 10 MG/1
10 TABLET ORAL DAILY
COMMUNITY

## 2022-09-17 RX ORDER — FUROSEMIDE 40 MG/1
40 TABLET ORAL DAILY
Qty: 30 TABLET | Refills: 0 | Status: SHIPPED | OUTPATIENT
Start: 2022-09-17

## 2022-09-17 RX ORDER — FUROSEMIDE 40 MG/1
40 TABLET ORAL DAILY
Status: DISCONTINUED | OUTPATIENT
Start: 2022-09-17 | End: 2022-09-17 | Stop reason: HOSPADM

## 2022-09-17 RX ORDER — METOPROLOL SUCCINATE 50 MG/1
50 TABLET, EXTENDED RELEASE ORAL EVERY 12 HOURS SCHEDULED
Qty: 60 TABLET | Refills: 0 | Status: SHIPPED | OUTPATIENT
Start: 2022-09-17

## 2022-09-17 RX ORDER — ASPIRIN 81 MG/1
81 TABLET, CHEWABLE ORAL DAILY
COMMUNITY
End: 2022-09-17 | Stop reason: HOSPADM

## 2022-09-17 RX ADMIN — AMIODARONE HYDROCHLORIDE 0.5 MG/MIN: 1.8 INJECTION, SOLUTION INTRAVENOUS at 08:24

## 2022-09-17 RX ADMIN — ENOXAPARIN SODIUM 90 MG: 100 INJECTION SUBCUTANEOUS at 05:20

## 2022-09-17 RX ADMIN — AMIODARONE HYDROCHLORIDE 1 MG/MIN: 1.8 INJECTION, SOLUTION INTRAVENOUS at 01:03

## 2022-09-17 RX ADMIN — METOPROLOL SUCCINATE 50 MG: 50 TABLET, EXTENDED RELEASE ORAL at 08:27

## 2022-09-17 RX ADMIN — LISINOPRIL 10 MG: 5 TABLET ORAL at 08:27

## 2022-09-17 RX ADMIN — FUROSEMIDE 40 MG: 10 INJECTION, SOLUTION INTRAMUSCULAR; INTRAVENOUS at 10:27

## 2022-09-17 RX ADMIN — AMIODARONE HYDROCHLORIDE 400 MG: 200 TABLET ORAL at 10:27

## 2022-09-17 RX ADMIN — AMIODARONE HYDROCHLORIDE 150 MG: 1.5 INJECTION, SOLUTION INTRAVENOUS at 00:49

## 2022-09-17 RX ADMIN — FUROSEMIDE 40 MG: 40 TABLET ORAL at 15:16

## 2022-09-17 RX ADMIN — TRAMADOL HYDROCHLORIDE 50 MG: 50 TABLET, COATED ORAL at 05:20

## 2022-09-17 NOTE — DISCHARGE SUMMARY
[facility] Medicine Services  DISCHARGE SUMMARY    Patient Name: Rodrigo Miguel  : 1950  MRN: [id number]    Date of Admission: 2022  Date of Discharge: 2022  Primary Care Physician: Jono Guzman MD      Presenting Problem:   Shortness of breath [R06.02]  Atrial fibrillation, unspecified type (HCC) [I48.91]    Active and Resolved Hospital Problems:  Active Hospital Problems    Diagnosis POA   • **Atrial fibrillation, unspecified type (HCC) [I48.91] Yes   • Primary hypertension [I10] Unknown   • S/P MVR (mitral valve replacement) [Z95.2] Not Applicable   • S/P AVR (aortic valve replacement) [Z95.2] Not Applicable   • S/P TVR (tricuspid valve repair) [Z98.890] Not Applicable   • Acute on chronic heart failure with preserved ejection fraction (HCC) [I50.33] Unknown      Resolved Hospital Problems   No resolved problems to display.         Hospital Course     Hospital Course:  Rodrigo Miguel is a 72 y.o. 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  Patient is from [place] and visiting here and saw his cardiologist there.  He was told that his aortic valve may not be functioning well and to look out for symptoms of dyspnea, chest pain, dizziness, etc.  Patient is status he came here over this week and has been having worsening dyspnea and fatigue.  He was feeling all right but on the day of admission his symptoms got worse which made him come to the ER.  In the ER, patient noted to have A. fib RVR.  Cardiology consulted on admission.  Started on a Cardizem drip initially but switched to amnio drip overnight due to hypotension.  The next day patient feeling significant better, remains clinically improved.  Switch to p.o. amiodarone and metoprolol per cardiology.  Echo showing moderate aortic stenosis and  moderate MR with preserved EF.  Lisinopril continued, Coreg changed to metoprolol as noted.  Okay to discharge per cardiology with plan to follow-up with patient's cardiologist in [place] within 1 week.  He may need ODESSA cardioversion there if symptoms persist.  Eliquis added for anticoagulation.  Patient is clinically improved and stable to discharge home today with follow-up with PCP and cardiology as an outpatient.    A/P:    A. fib RVR, improved  -Off Cardizem and amnio drip, p.o. amiodarone and metoprolol added  -C2V greater than 2, discussed with cardiology to start antirotation, Lovenox switched to Eliquis on discharge  -Echo showing moderate aortic stenosis and moderate MR, preserved EF  -Okay to discharge per cardiology with patient to follow-up with his cardiologist [place] on discharge.  Patient going back to his home tomorrow.     Valvular heart disease  -s/p aortic and tricuspid valve replacement in 2016  -Not on any anticoagulation other than baby aspirin at home  -Echo as noted above  -Cardiology following    Acute on chronic HFpEF  -proBNP elevated on admission, echo showing preserved EF  -P.o. Lasix added per cardiology  -Continue beta-blocker  -Continue outpatient follow-up with cardiology     Hypertension  -Continue lisinopril  -Coreg switched to metoprolol and Lasix added  -Monitor     Chronic back pain  -Continue symptomatic management  -Patient has scheduled outpatient follow-up with pain management when he goes back    DISCHARGE Follow Up Recommendations for labs and diagnostics:   Follow-up with PCP and cardiology in [place].    Reasons For Change In Medications and Indications for New Medications:  Medication changes as below    Day of Discharge     Vital Signs:  Temp:  [97.8 °F (36.6 °C)-98.4 °F (36.9 °C)] 98.3 °F (36.8 °C)  Heart Rate:  [] 115  Resp:  [16-20] 16  BP: ()/(40-90) 94/64    Physical Exam:  General: Awake, alert, NAD  Eyes: PERRL, EOMI,  conjunctive are clear  Cardiovascular: Tachycardic, irregularly irregular rhythm, no murmurs  Respiratory: Clear to auscultation bilaterally, no wheezing or rales, unlabored breathing  Abdomen: Soft, nontender, positive bowel sounds, no guarding  Neurologic: A&O, CN grossly intact, moves all extremities spontaneously  Musculoskeletal: Normal range of motion, no deformities  Skin: Warm, dry, intact        Pertinent  and/or Most Recent Results     LAB RESULTS:      Lab 09/16/22  1620   WBC 10.20   HEMOGLOBIN 14.3   HEMATOCRIT 42.2   PLATELETS 257   NEUTROS ABS 8.00*   LYMPHS ABS 1.00   MONOS ABS 1.10*   EOS ABS 0.00   MCV 90.6         Lab 09/16/22  1656   SODIUM 136   POTASSIUM 4.8   CHLORIDE 99   CO2 23.0   ANION GAP 14.0   BUN 18   CREATININE 1.17   EGFR 66.2   GLUCOSE 124*   CALCIUM 8.8         Lab 09/16/22  1656   TOTAL PROTEIN 6.5   ALBUMIN 4.00   GLOBULIN 2.5   ALT (SGPT) 14   AST (SGOT) 20   BILIRUBIN 1.1   ALK PHOS 26*         Lab 09/16/22  1656   PROBNP 2,234.0*   TROPONIN T <0.010                 Brief Urine Lab Results     None        Microbiology Results (last 10 days)     ** No results found for the last 240 hours. **          XR Chest 1 View    Result Date: 9/16/2022  Impression: No acute chest finding. Mild cardiomegaly with valve replacement.  Electronically Signed By-Nanda Aparicio MD On:9/16/2022 4:13 PM This report was finalized on [id number] by  Nanda Aparicio MD.              Results for orders placed during the hospital encounter of 09/16/22    Adult Transthoracic Echo Complete W/ Cont if Necessary Per Protocol    Interpretation Summary  · The left ventricular cavity is borderline dilated.  · Calculated left ventricular EF = 58% Estimated left ventricular EF was in agreement with the calculated left ventricular EF. Left ventricular systolic function is normal.  · Left ventricular diastolic dysfunction is noted.  · Left atrial volume is moderately increased.  · The right atrial cavity is  moderately dilated.  · There is a prosthetic aortic valve present.  · Moderate to severe aortic valve stenosis is present.  · There is a prosthetic mitral valve present.  · Moderate to severe mitral valve regurgitation is present with an eccentric jet noted.  · Moderate to severe mitral valve stenosis is present.  · Estimated right ventricular systolic pressure from tricuspid regurgitation is mildly elevated (35-45 mmHg).  · Mild to moderate pulmonary hypertension is present.  · Consider ODESSA assessment of the valves when the HR is controlled      Labs Pending at Discharge:  Pending Labs     Order Current Status    Extra Tubes In process          Procedures Performed           Consults:   Consults     Date and Time Order Name Status Description    9/16/2022  5:48 PM Inpatient Cardiology Consult Completed             Discharge Details        Discharge Medications      New Medications      Instructions Start Date   amiodarone 400 MG tablet  Commonly known as: PACERONE   400 mg, Oral, Every 12 Hours Scheduled      apixaban 5 MG tablet tablet  Commonly known as: ELIQUIS   5 mg, Oral, Every 12 Hours Scheduled      furosemide 40 MG tablet  Commonly known as: LASIX   40 mg, Oral, Daily      metoprolol succinate XL 50 MG 24 hr tablet  Commonly known as: TOPROL-XL   50 mg, Oral, Every 12 Hours Scheduled         Continue These Medications      Instructions Start Date   Diclofenac Sodium 1 % gel gel  Commonly known as: VOLTAREN   4 g, Topical, 4 Times Daily      lisinopril 10 MG tablet  Commonly known as: PRINIVIL,ZESTRIL   10 mg, Oral, Daily      multivitamin with minerals tablet tablet   1 tablet, Oral, Daily      pantoprazole 20 MG EC tablet  Commonly known as: PROTONIX   20 mg, Oral, Daily         Stop These Medications    aspirin 81 MG chewable tablet     carvedilol 12.5 MG tablet  Commonly known as: COREG            Allergies   Allergen Reactions   • Augmentin [Amoxicillin-Pot Clavulanate] Other (See Comments)      Abnormal liver function labs   • Clindamycin/Lincomycin Other (See Comments)     Pt reports clinda mycin causes abnormal liver function          Discharge Disposition:  Home or Self Care    Diet:  Hospital:  Diet Order   Procedures   • Diet Cardiac, Diabetic/Consistent Carbs; Healthy Heart; Diabetic - Consistent Carb         Discharge Activity:         CODE STATUS:  Code Status and Medical Interventions:   Ordered at: 09/16/22 1725     Code Status (Patient has no pulse and is not breathing):    CPR (Attempt to Resuscitate)     Medical Interventions (Patient has pulse or is breathing):    Full Support         No future appointments.        Time spent on Discharge including face to face service:  25 minutes    Signature:    Electronically signed by Edith Mcmullen DO, 09/17/22, 1:38 PM EDT.      Much of this encounter note is an electronic transcription/translation of spoken language to printed text. The electronic translation of spoken language may permit erroneous, or at times, nonsensical words or phrases to be inadvertently transcribed; Although I have reviewed the note for such errors, some may still exist.

## 2022-09-17 NOTE — PROGRESS NOTES
Referring Provider: Edith Mcmullen DO    Reason for follow-up: A fib RVR and acute on chronic HFpEF     Patient Care Team:  Jono Guzman MD as PCP - General (Cardiology)    Subjective .   His shortness of breath has improved, heart rate has improved his back pain has improved  Overall he feels much better.  Daughter is at bedside.  They have multiple questions today.    ROS    Since I have last seen, the patient has been without any chest discomfort ,shortness of breath, palpitations, dizziness or syncope.  Denies having any headache ,abdominal pain ,nausea, vomiting , diarrhea constipation, loss of weight or loss of appetite.  Denies having any excessive bruising ,hematuria or blood in the stool.    Review of all systems negative except as indicated    History  History reviewed. No pertinent past medical history.    History reviewed. No pertinent surgical history.    History reviewed. No pertinent family history.    Social History     Tobacco Use   • Smoking status: Never Smoker   • Smokeless tobacco: Never Used        Medications Prior to Admission   Medication Sig Dispense Refill Last Dose   • aspirin 81 MG chewable tablet Chew 81 mg Daily.   9/16/2022 at Unknown time   • carvedilol (COREG) 12.5 MG tablet Take 12.5 mg by mouth 2 (Two) Times a Day With Meals.   9/16/2022 at Unknown time   • Diclofenac Sodium (VOLTAREN) 1 % gel gel Apply 4 g topically to the appropriate area as directed 4 (Four) Times a Day.   9/16/2022 at Unknown time   • lisinopril (PRINIVIL,ZESTRIL) 10 MG tablet Take 10 mg by mouth Daily.   9/16/2022 at Unknown time   • multivitamin with minerals tablet tablet Take 1 tablet by mouth Daily.   9/16/2022 at Unknown time   • pantoprazole (PROTONIX) 20 MG EC tablet Take 20 mg by mouth Daily.   9/16/2022 at Unknown time       Allergies  Augmentin [amoxicillin-pot clavulanate] and Clindamycin/lincomycin    Scheduled Meds:[START ON 9/18/2022] amiodarone, 200 mg, Oral, Once   Followed by  [START ON  "9/18/2022] amiodarone, 400 mg, Oral, Q12H   Followed by  [START ON 9/28/2022] amiodarone, 200 mg, Oral, Q12H   Followed by  [START ON 10/12/2022] amiodarone, 200 mg, Oral, Daily  apixaban, 5 mg, Oral, Q12H  furosemide, 40 mg, Intravenous, Once  furosemide, 40 mg, Oral, Daily  lisinopril, 10 mg, Oral, Q24H  metoprolol succinate XL, 50 mg, Oral, Q12H  sodium chloride, 10 mL, Intravenous, Q12H      Continuous Infusions:amiodarone, 0.5 mg/min, Last Rate: 0.5 mg/min (09/17/22 0824)  dilTIAZem, 5-15 mg/hr, Last Rate: Stopped (09/17/22 0045)      PRN Meds:.•  acetaminophen **OR** acetaminophen **OR** acetaminophen  •  nitroglycerin  •  ondansetron **OR** ondansetron  •  [COMPLETED] Insert peripheral IV **AND** sodium chloride  •  sodium chloride  •  traMADol    Objective     VITAL SIGNS  Vitals:    09/17/22 0425 09/17/22 0455 09/17/22 0537 09/17/22 0555   BP: 102/57 (!) 89/64 106/66 106/77   BP Location:       Patient Position:       Pulse: 77 74 84 78   Resp:   20    Temp:   97.8 °F (36.6 °C)    TempSrc:   Oral    SpO2: 97% 92% 92% 95%   Weight:   88.3 kg (194 lb 10.7 oz)    Height:           Flowsheet Rows    Flowsheet Row First Filed Value   Admission Height 172.7 cm (68\") Documented at 09/16/2022 1537   Admission Weight 88.6 kg (195 lb 5.2 oz) Documented at 09/16/2022 1537            Intake/Output Summary (Last 24 hours) at 9/17/2022 0942  Last data filed at 9/17/2022 0600  Gross per 24 hour   Intake 200 ml   Output 600 ml   Net -400 ml        TELEMETRY: Atrial fibrillation with controlled heart rate around 100    Physical Exam:  The patient is alert, oriented and in no distress.  Vital signs as noted above.  Head and neck revealed no carotid bruits or jugular venous distention.  No thyromegaly or lymphadenopathy is present  Lungs clear.  No wheezing.  Breath sounds are normal bilaterally.  Heart normal first and second heart sounds.  No murmur. No precordial rub is present.  No gallop is present.  Abdomen soft and " nontender.  No organomegaly is present.  Extremities with good peripheral pulses without any pedal edema.  Skin warm and dry.  Musculoskeletal system is grossly normal  CNS grossly normal      Results Review:   I reviewed the patient's new clinical results.  Lab Results (last 24 hours)     Procedure Component Value Units Date/Time    Extra Tubes [[id number]] Collected: 09/16/22 1656    Specimen: Blood, Venous Line Updated: 09/16/22 1715    Narrative:      The following orders were created for panel order Extra Tubes.  Procedure                               Abnormality         Status                     ---------                               -----------         ------                     Gold Top - SST[[id number]]                                   Final result               Light Blue Top[[id number]]                                                                Please view results for these tests on the individual orders.    Gold Top - SST [[id number]] Collected: 09/16/22 1656    Specimen: Blood Updated: 09/16/22 1715    Comprehensive Metabolic Panel [[id number]]  (Abnormal) Collected: 09/16/22 1656    Specimen: Blood Updated: 09/16/22 1712     Glucose 124 mg/dL      BUN 18 mg/dL      Creatinine 1.17 mg/dL      Sodium 136 mmol/L      Potassium 4.8 mmol/L      Comment: Slight hemolysis detected by analyzer. Results may be affected.        Chloride 99 mmol/L      CO2 23.0 mmol/L      Calcium 8.8 mg/dL      Total Protein 6.5 g/dL      Albumin 4.00 g/dL      ALT (SGPT) 14 U/L      AST (SGOT) 20 U/L      Alkaline Phosphatase 26 U/L      Total Bilirubin 1.1 mg/dL      Globulin 2.5 gm/dL      A/G Ratio 1.6 g/dL      BUN/Creatinine Ratio 15.4     Anion Gap 14.0 mmol/L      eGFR 66.2 mL/min/1.73      Comment: National Kidney Foundation and American Society of Nephrology (ASN) Task Force recommended calculation based on the Chronic Kidney Disease Epidemiology Collaboration (CKD-EPI) equation refit without adjustment for race.        Narrative:      GFR Normal >60  Chronic Kidney Disease <60  Kidney Failure <15      Troponin [[id number]]  (Normal) Collected: 09/16/22 1656    Specimen: Blood Updated: 09/16/22 1712     Troponin T <0.010 ng/mL     Narrative:      Troponin T Reference Range:  <= 0.03 ng/mL-   Negative for AMI  >0.03 ng/mL-     Abnormal for myocardial necrosis.  Clinicians would have to utilize clinical acumen, EKG, Troponin and serial changes to determine if it is an Acute Myocardial Infarction or myocardial injury due to an underlying chronic condition.       Results may be falsely decreased if patient taking Biotin.      BNP [[id number]]  (Abnormal) Collected: 09/16/22 1656    Specimen: Blood Updated: 09/16/22 1711     proBNP 2,234.0 pg/mL     Narrative:      Among patients with dyspnea, NT-proBNP is highly sensitive for the detection of acute congestive heart failure. In addition NT-proBNP of <300 pg/ml effectively rules out acute congestive heart failure with 99% negative predictive value.    Results may be falsely decreased if patient taking Biotin.      CBC & Differential [[id number]]  (Abnormal) Collected: 09/16/22 1620    Specimen: Blood from Arm, Right Updated: 09/16/22 1629    Narrative:      The following orders were created for panel order CBC & Differential.  Procedure                               Abnormality         Status                     ---------                               -----------         ------                     CBC Auto Differential[[id number]]        Abnormal            Final result                 Please view results for these tests on the individual orders.    CBC Auto Differential [[id number]]  (Abnormal) Collected: 09/16/22 1620    Specimen: Blood from Arm, Right Updated: 09/16/22 1629     WBC 10.20 10*3/mm3      RBC 4.66 10*6/mm3      Hemoglobin 14.3 g/dL      Hematocrit 42.2 %      MCV 90.6 fL      MCH 30.6 pg      MCHC 33.7 g/dL      RDW 13.4 %      RDW-SD 42.4 fl      MPV 8.4 fL      Platelets  257 10*3/mm3      Neutrophil % 78.2 %      Lymphocyte % 10.0 %      Monocyte % 11.1 %      Eosinophil % 0.3 %      Basophil % 0.4 %      Neutrophils, Absolute 8.00 10*3/mm3      Lymphocytes, Absolute 1.00 10*3/mm3      Monocytes, Absolute 1.10 10*3/mm3      Eosinophils, Absolute 0.00 10*3/mm3      Basophils, Absolute 0.00 10*3/mm3      nRBC 0.1 /100 WBC           Imaging Results (Last 24 Hours)     Procedure Component Value Units Date/Time    XR Chest 1 View [[id number]] Collected: 09/16/22 1612     Updated: 09/16/22 1615    Narrative:      DATE OF EXAM:  9/16/2022 4:09 PM     PROCEDURE:  XR CHEST 1 VW-     INDICATIONS:  dyspnea       COMPARISON:  None     TECHNIQUE:   Single radiographic view of the chest was obtained.     FINDINGS:  Mild interstitial prominence in the bases is favored to represent a  chronic finding. No acute airspace disease. Heart size is mildly  enlarged with median sternotomy and valve replacement. Pulmonary  vascular distribution is within normal limits. No pleural effusion or  pneumothorax or acute osseous abnormality.       Impression:      No acute chest finding.  Mild cardiomegaly with valve replacement.     Electronically Signed By-Nanda Aparicio MD On:9/16/2022 4:13 PM  This report was finalized on [id number] by  Nanda Aparicio MD.      LAB RESULTS (LAST 7 DAYS)    CBC  Results from last 7 days   Lab Units 09/16/22  1620   WBC 10*3/mm3 10.20   RBC 10*6/mm3 4.66   HEMOGLOBIN g/dL 14.3   HEMATOCRIT % 42.2   MCV fL 90.6   PLATELETS 10*3/mm3 257       BMP  Results from last 7 days   Lab Units 09/16/22  1656   SODIUM mmol/L 136   POTASSIUM mmol/L 4.8   CHLORIDE mmol/L 99   CO2 mmol/L 23.0   BUN mg/dL 18   CREATININE mg/dL 1.17   GLUCOSE mg/dL 124*       CMP   Results from last 7 days   Lab Units 09/16/22  1656   SODIUM mmol/L 136   POTASSIUM mmol/L 4.8   CHLORIDE mmol/L 99   CO2 mmol/L 23.0   BUN mg/dL 18   CREATININE mg/dL 1.17   GLUCOSE mg/dL 124*   ALBUMIN g/dL 4.00   BILIRUBIN mg/dL  1.1   ALK PHOS U/L 26*   AST (SGOT) U/L 20   ALT (SGPT) U/L 14         BNP        TROPONIN  Results from last 7 days   Lab Units 09/16/22  1656   TROPONIN T ng/mL <0.010       CoAg        Creatinine Clearance  Estimated Creatinine Clearance: 61.7 mL/min (by C-G formula based on SCr of 1.17 mg/dL).    ABG        Radiology  XR Chest 1 View    Result Date: 9/16/2022  No acute chest finding. Mild cardiomegaly with valve replacement.  Electronically Signed By-Nanda Aparicio MD On:9/16/2022 4:13 PM This report was finalized on [id number] by  Nanda Aparicio MD.          EKG      I personally viewed and interpreted the patient's EKG/Telemetry data:    ECHOCARDIOGRAM:    Results for orders placed during the hospital encounter of 09/16/22    Adult Transthoracic Echo Complete W/ Cont if Necessary Per Protocol    Interpretation Summary  · The left ventricular cavity is borderline dilated.  · Calculated left ventricular EF = 58% Estimated left ventricular EF was in agreement with the calculated left ventricular EF. Left ventricular systolic function is normal.  · Left ventricular diastolic dysfunction is noted.  · Left atrial volume is moderately increased.  · The right atrial cavity is moderately dilated.  · There is a prosthetic aortic valve present.  · Moderate to severe aortic valve stenosis is present.  · There is a prosthetic mitral valve present.  · Moderate to severe mitral valve regurgitation is present with an eccentric jet noted.  · Moderate to severe mitral valve stenosis is present.  · Estimated right ventricular systolic pressure from tricuspid regurgitation is mildly elevated (35-45 mmHg).  · Mild to moderate pulmonary hypertension is present.  · Consider ODESSA assessment of the valves when the HR is controlled          STRESS MYOVIEW:    Cardiolite (Tc-99m Sestamibi) stress test    CARDIAC CATHETERIZATION:            OTHER:         Assessment & Plan     Principal Problem:    SVT (supraventricular tachycardia)  ([facility])  Active Problems:    Atrial fibrillation, unspecified type ([facility])    Primary hypertension    S/P MVR (mitral valve replacement)    S/P AVR (aortic valve replacement)    S/P TVR (tricuspid valve repair)    Acute on chronic heart failure with preserved ejection fraction ([facility])    72-year-old man status post aortic and mitral valve replacement is here with acute on chronic exacerbation of heart failure with preserved ejection fraction and atrial fibrillation with rapid ventricular response.  He presented with NYHA class III symptoms  He was started on IV amiodarone as well as Toprol-XL.  He was given a dose of IV diuretics with symptomatic improvement  He will be started on oral anticoagulation today  Cardizem drip has been weaned off  Echocardiogram showed moderate aortic stenosis and moderate to severe mitral valve regurgitation in the setting of atrial fibrillation with rapid ventricular response  I have recommended a repeat assessment of both the valves when he is not in atrial fibrillation or in rapid ventricular rate.  ODESSA can be performed in [place] to better assess the valve  Lisinopril has been resumed, Coreg has been discontinued  He may be switched to oral amiodarone after loading is completed.  400 mg p.o. twice daily  I will increase Toprol-XL dose : 50mg BID  Coreg will be held.  I will start him on oral diuretics and give another dose of IV lasix today  I will start him on oral anticoagulation with Eliquis  His back pain is better.  I have encouraged him to ambulate to have a better assessment of his heart rate.  I have advised him to have outpatient sleep study  If his heart rate continues to be well controlled, he may be discharged later in the day with plans to follow-up with his cardiologist in [place].        Jono Guzman MD  09/17/22  09:42 EDT

## 2022-09-17 NOTE — PLAN OF CARE
Goal Outcome Evaluation:  Plan of Care Reviewed With: patient   Telemetry shows patient still in afib; heart rate controlled with IV amiodarone drip; daughter at bedside; will continue to monitor patient.     Progress: improving

## 2022-09-18 NOTE — OUTREACH NOTE
Prep Survey    Flowsheet Row Responses   [facility] facility patient discharged from? [facility]   Is LACE score < 7 ? Yes   Emergency Room discharge w/ pulse ox? No   Eligibility Readm Mgmt   Discharge diagnosis  Dyspnea A. fib RVR   Does the patient have one of the following disease processes/diagnoses(primary or secondary)? Other   Does the patient have Home health ordered? No   Is there a DME ordered? No   Prep survey completed? Yes          GAVINO THIBODEAUX - Registered Nurse

## 2022-09-19 LAB
QT INTERVAL: 352 MS
QT INTERVAL: 440 MS